# Patient Record
Sex: MALE | Race: BLACK OR AFRICAN AMERICAN
[De-identification: names, ages, dates, MRNs, and addresses within clinical notes are randomized per-mention and may not be internally consistent; named-entity substitution may affect disease eponyms.]

---

## 2018-12-27 ENCOUNTER — HOSPITAL ENCOUNTER (INPATIENT)
Dept: HOSPITAL 92 - SCSER | Age: 53
LOS: 3 days | Discharge: HOME | DRG: 357 | End: 2018-12-30
Attending: FAMILY MEDICINE | Admitting: FAMILY MEDICINE
Payer: COMMERCIAL

## 2018-12-27 DIAGNOSIS — K44.9: Primary | ICD-10-CM

## 2018-12-27 DIAGNOSIS — E66.01: ICD-10-CM

## 2018-12-27 DIAGNOSIS — Z79.4: ICD-10-CM

## 2018-12-27 DIAGNOSIS — I10: ICD-10-CM

## 2018-12-27 DIAGNOSIS — G47.33: ICD-10-CM

## 2018-12-27 DIAGNOSIS — K21.0: ICD-10-CM

## 2018-12-27 DIAGNOSIS — E11.9: ICD-10-CM

## 2018-12-27 DIAGNOSIS — K83.9: ICD-10-CM

## 2018-12-27 DIAGNOSIS — E78.5: ICD-10-CM

## 2018-12-27 LAB
ALBUMIN SERPL BCG-MCNC: 4.6 G/DL (ref 3.5–5)
ALP SERPL-CCNC: 111 U/L (ref 40–150)
ALT SERPL W P-5'-P-CCNC: 52 U/L (ref 8–55)
ANION GAP SERPL CALC-SCNC: 20 MMOL/L (ref 10–20)
AST SERPL-CCNC: 30 U/L (ref 5–34)
BILIRUB SERPL-MCNC: 0.7 MG/DL (ref 0.2–1.2)
BUN SERPL-MCNC: 16 MG/DL (ref 8.4–25.7)
CALCIUM SERPL-MCNC: 9.7 MG/DL (ref 7.8–10.44)
CHLORIDE SERPL-SCNC: 101 MMOL/L (ref 98–107)
CO2 SERPL-SCNC: 20 MMOL/L (ref 22–29)
CREAT CL PREDICTED SERPL C-G-VRATE: 0 ML/MIN (ref 70–130)
GLOBULIN SER CALC-MCNC: 3.7 G/DL (ref 2.4–3.5)
GLUCOSE SERPL-MCNC: 135 MG/DL (ref 70–105)
HGB BLD-MCNC: 15.4 G/DL (ref 14–18)
LIPASE SERPL-CCNC: 24 U/L (ref 8–78)
MCH RBC QN AUTO: 27.7 PG (ref 27–31)
MCV RBC AUTO: 91.1 FL (ref 78–98)
MDIFF COMPLETE?: YES
PLATELET # BLD AUTO: 367 THOU/UL (ref 130–400)
PLATELET BLD QL SMEAR: (no result)
POTASSIUM SERPL-SCNC: 3.6 MMOL/L (ref 3.5–5.1)
RBC # BLD AUTO: 5.55 MILL/UL (ref 4.7–6.1)
SODIUM SERPL-SCNC: 137 MMOL/L (ref 136–145)
WBC # BLD AUTO: 8.8 THOU/UL (ref 4.8–10.8)

## 2018-12-27 PROCEDURE — 88305 TISSUE EXAM BY PATHOLOGIST: CPT

## 2018-12-27 PROCEDURE — 36416 COLLJ CAPILLARY BLOOD SPEC: CPT

## 2018-12-27 PROCEDURE — 93306 TTE W/DOPPLER COMPLETE: CPT

## 2018-12-27 PROCEDURE — 96375 TX/PRO/DX INJ NEW DRUG ADDON: CPT

## 2018-12-27 PROCEDURE — 88313 SPECIAL STAINS GROUP 2: CPT

## 2018-12-27 PROCEDURE — 84484 ASSAY OF TROPONIN QUANT: CPT

## 2018-12-27 PROCEDURE — 80053 COMPREHEN METABOLIC PANEL: CPT

## 2018-12-27 PROCEDURE — 0FT44ZZ RESECTION OF GALLBLADDER, PERCUTANEOUS ENDOSCOPIC APPROACH: ICD-10-PCS | Performed by: SPECIALIST

## 2018-12-27 PROCEDURE — 93017 CV STRESS TEST TRACING ONLY: CPT

## 2018-12-27 PROCEDURE — S0028 INJECTION, FAMOTIDINE, 20 MG: HCPCS

## 2018-12-27 PROCEDURE — 0DB58ZX EXCISION OF ESOPHAGUS, VIA NATURAL OR ARTIFICIAL OPENING ENDOSCOPIC, DIAGNOSTIC: ICD-10-PCS | Performed by: INTERNAL MEDICINE

## 2018-12-27 PROCEDURE — 88312 SPECIAL STAINS GROUP 1: CPT

## 2018-12-27 PROCEDURE — 83036 HEMOGLOBIN GLYCOSYLATED A1C: CPT

## 2018-12-27 PROCEDURE — A9500 TC99M SESTAMIBI: HCPCS

## 2018-12-27 PROCEDURE — 84443 ASSAY THYROID STIM HORMONE: CPT

## 2018-12-27 PROCEDURE — 78452 HT MUSCLE IMAGE SPECT MULT: CPT

## 2018-12-27 PROCEDURE — 88304 TISSUE EXAM BY PATHOLOGIST: CPT

## 2018-12-27 PROCEDURE — 93005 ELECTROCARDIOGRAM TRACING: CPT

## 2018-12-27 PROCEDURE — 96374 THER/PROPH/DIAG INJ IV PUSH: CPT

## 2018-12-27 PROCEDURE — 83690 ASSAY OF LIPASE: CPT

## 2018-12-27 PROCEDURE — 80061 LIPID PANEL: CPT

## 2018-12-27 PROCEDURE — 85025 COMPLETE CBC W/AUTO DIFF WBC: CPT

## 2018-12-27 PROCEDURE — 71046 X-RAY EXAM CHEST 2 VIEWS: CPT

## 2018-12-27 PROCEDURE — 76700 US EXAM ABDOM COMPLETE: CPT

## 2018-12-27 PROCEDURE — 96361 HYDRATE IV INFUSION ADD-ON: CPT

## 2018-12-27 PROCEDURE — 36415 COLL VENOUS BLD VENIPUNCTURE: CPT

## 2018-12-27 NOTE — RAD
TWO VIEWS OF THE CHEST:

12/27/18

 

COMPARISON:  

7/18/11

 

HISTORY: 

Nausea, vomiting, and chest pain. 

 

FINDINGS:

Two views of the chest show normal sized cardiomediastinal silhouette. There is no evidence of consol
idation, mass, or pleural effusion. The bones are unremarkable.

 

IMPRESSION:

No evidence of acute cardiopulmonary disease.

 

POS: [facility]

## 2018-12-28 LAB
CHD RISK SERPL-RTO: 3.9 (ref ?–4.5)
CHOLEST SERPL-MCNC: 106 MG/DL
HDLC SERPL-MCNC: 27 MG/DL
LDLC SERPL CALC-MCNC: 61 MG/DL
TRIGL SERPL-MCNC: 92 MG/DL (ref ?–150)

## 2018-12-28 RX ADMIN — NITROGLYCERIN SCH: 20 OINTMENT TOPICAL at 11:02

## 2018-12-28 RX ADMIN — Medication SCH ML: at 20:48

## 2018-12-28 RX ADMIN — NITROGLYCERIN SCH: 20 OINTMENT TOPICAL at 06:47

## 2018-12-28 RX ADMIN — NITROGLYCERIN SCH: 20 OINTMENT TOPICAL at 03:43

## 2018-12-28 RX ADMIN — NITROGLYCERIN SCH: 20 OINTMENT TOPICAL at 18:29

## 2018-12-28 RX ADMIN — ASPIRIN SCH: 81 TABLET ORAL at 08:06

## 2018-12-28 RX ADMIN — ALOGLIPTIN SCH: 25 TABLET, FILM COATED ORAL at 09:13

## 2018-12-28 RX ADMIN — NITROGLYCERIN SCH: 20 OINTMENT TOPICAL at 23:36

## 2018-12-28 RX ADMIN — Medication SCH ML: at 08:07

## 2018-12-28 NOTE — CON
DATE OF CONSULTATION:  



REASON FOR CONSULTATION:  Abdominal discomfort.



HISTORY OF PRESENT ILLNESS:  Mr. Fuentes is a pleasant 53-year-old gentleman with

history of diabetes mellitus, hypertension, and hyperlipidemia, who recently

presented with abdominal discomfort.  The pain has waxed and waned.  It may be

associated with eating.  He states he has not had any chest discomfort.  This

refutes the report in the emergency room of the patient having pain.  He is adamant

that he has had no chest discomfort present.  He states the pain has been a crampy

like discomfort noted in the mid epigastric region.  He has had nausea with

retching.  No other __________ present.  His troponin is negative.  His EKG is not

felt to be specific.  His bedside echo also showed a normal LVEF with no wall motion

abnormalities present. 



PAST MEDICAL HISTORY:  As above including tonsillectomy.



ALLERGIES:  PENICILLIN.



HOME MEDICATIONS:  Include,

1. Atorvastatin.

2. Hydrochlorothiazide.

3. Amlodipine.

4. Ambien.

5. Aspirin.

6. Omeprazole.



REVIEW OF SYSTEMS:  A 10-point review of systems is reviewed and as above, otherwise

negative. 



PHYSICAL EXAMINATION:

GENERAL:  Patient is a pleasant 53-year-old male who is in no acute distress.  The

patient appears their stated age. 

VITAL SIGNS:  Blood pressure 131/76, pulse 87, temperature 97.7. 

NEUROLOGIC:  The patient is alert and oriented x3 with no focal neurologic deficits. 

HEENT:  Sclerae without icterus.  Mouth has moist mucous membranes with normal

pallor. 

NECK:  No JVD.  Carotid upstroke brisk.  No bruits bilaterally. 

LUNGS:  Clear to auscultation with unlabored respirations. 

BACK:  No scoliosis or kyphosis. 

CARDIAC:  Regular rate and rhythm with normal S1 and S2.  No S3 or S4 noted.  No

significant rubs, murmurs, thrills, or gallops noted throughout the precordium.  PMI

is not displaced.  There is no parasternal heave. 

ABDOMEN:  Mild discomfort noted in the mid epigastric region with palpation. 

EXTREMITIES:  2+ femoral and 2+ dorsalis pedis pulses.  No cyanosis, clubbing, or

edema. 

SKIN:  No gross abnormalities.



PERTINENT LABORATORY DATA:  As above.



IMPRESSION:  

1. Epigastric pain.

2. Diabetes mellitus.

3. Hypertension.



RECOMMENDATIONS:  __________ could proceed with coronary angiography, although given

his negative troponin, negative EKG, and negative echo, could proceed with a more

conservative approach.  A stress test has been ordered and certainly is appropriate.

 I would also recommend GI consultation to help assist with the current symptoms.

We will get the stress part today and proceed with the rest part tomorrow.  If the

stress part does show any abnormalities, may proceed with angio today.  I did

discuss with Mr. Fuentes. 







Job ID:  [id number]

## 2018-12-28 NOTE — NM
NUCLEAR MEDICINE CARDIAC PERFUSION EXAMINATION WITH EJECTION FRACTION

12/28/18

 

HISTORY: 

53-year-old male with chest pain, diabetes, and dyslipidemia. 

 

TECHNIQUE:  

A stress only nuclear medicine cardiac perfusion examination was performed using 30 millicuries techn
etium 99m Sestamibi and Adenosine.

 

FINDINGS:  

The nonattenuated corrected images show no perfusion defects with stress. 

 

Gated images show normal wall motion with ejection fraction of 61%. 

 

EDV is 100 mL. 

LHR is 0.2.

 

IMPRESSION:  

No perfusion defects seen with stress. 

 

POS: [facility]

## 2018-12-28 NOTE — HP
CHIEF COMPLAINT:  Atypical chest pain.



HISTORY OF PRESENT ILLNESS:  The patient is a 53-year-old male with onset

approximately 2 weeks ago of severe indigestion, worse days he has ever had in 
his

life; extremely diaphoretic, vomited several times, took hours to get relief, 
but he

was out of town in [place] at that time, eroded out and then this episode began

similarly with severe indigestion.  He vomited several times, very diaphoretic.

Denies shortness of breath, but has a lot of substernal discomfort.  He states 
that

it does not radiate, but he has multiple risk factors.  He is already on Pepcid 
on a

regular basis to treat GERD.  He has hypertension.  He has non-insulin-dependent

diabetes, stressful job as a long-distance , morbid obesity,

hyperlipidemia.  He does not smoke, but he is very noncompliant with his care 
and

this episode required some nitroglycerin to finally get relief in the emergency 
room

in [facility] in [place], at which time, Dr. Duncan was consulted and 
agreed

to admit him for further workup. 



PAST MEDICAL HISTORY:  As stated above.  He is noncompliant with his medical 
care.

He is a non-insulin-dependent diabetic.  He has dyslipidemia, morbid obesity, 
and

questionable history of obstructive sleep apnea.  He is not sure if he recalls a

sleep study in his past or not.  This has been recommended and he has not 
complied

to get this. GERD, HTN, shift work sleep disorder, obesity.



PAST SURGICAL HISTORY:  Tonsillectomy.



PSYCHIATRIC HISTORY:  There is no psychiatric history.



SOCIAL HISTORY:  As mentioned, he is a long-distance .  Denies 
alcohol

or tobacco use.  He is . 



ALLERGIES:  THE PATIENT IS ALLERGIC TO PENICILLIN.



CURRENT MEDICATIONS:  Include; 

1. Atorvastatin 40 mg at bedtime.

2. Hydrochlorothiazide 25 mg q.a.m.

3. Amlodipine 10 mg q.a.m.

4. Ambien 10 mg at bedtime as needed for irregular sleep phases being a

long-distance . 

5. Aspirin 81 mg daily.

6. Omeprazole 20 mg daily.

7. Glyxambi 25/5 one p.o. daily.



REVIEW OF SYSTEMS:  GENERAL:  At the time of admission, admits to being weak 
with

nausea and vomiting, but denies fever or chills. 

HEENT:  No sores in ear, nose, and throat.  No drainage. 

CHEST:  Denies shortness of breath or coughing. 

CARDIOVASCULAR:  Has substernal burning.  Has severe indigestion.  Denies

palpitations. 

GASTROINTESTINAL:  Reports both nausea and vomiting.  No diarrhea. 

GENITOURINARY:  No dysuria.  No blood in urine or stool. 

MUSCULOSKELETAL:  No complaints in any arms, legs, or major joints. 

SKIN:  No acute rashes or lesions. 

NEUROLOGIC:  Denies any headaches or focal weakness or paresthesia/anesthesia. 

HEMOLYMPHATIC:  No new areas of bruising, bleeding, or ecchymosis.



PHYSICAL EXAMINATION:

VITAL SIGNS:  At the time of admission, blood pressure 143/78, pulse 84,

respirations 16, temperature 97.9 with pain scale of 3/10, and O2 saturation 94
% on

room air. 

GENERAL:  This is an obese  male, alert, oriented, and 
cooperative. 

HEENT:  Normocephalic, atraumatic.  Pupils are equal, round, and reactive to 
light.

Extraocular muscles are intact.  TMs, nares, and pharynx are clear. 

NECK:  Supple.  Trachea midline.  No bruits bilaterally.  No mass. 

CHEST:  Good breath sounds bilaterally.  Nontender to palpation. 

HEART:  Regular rate and rhythm.  Distant heart sounds.  No audible murmurs. 

ABDOMEN:  Obese, nontender.  Unable to appreciate any organomegaly. 

GENITOURINARY:  Deferred. 

EXTREMITIES:  Without clubbing, cyanosis, or edema.  Normal range of motion 
present.

 Symmetric muscular tone development noted. 

SKIN:  No acute rashes or lesions.  He is mildly diaphoretic. 

NEUROLOGICAL:  Cranial nerves are intact.  Gait and cerebellar function are 
untested

at this time.  Sensory exam is generally intact.  Mental status shows nonfocal. 



DIAGNOSTIC DATA:  EKG showed an 81 beats per minute, incomplete right bundle-
branch

block with nonspecific ST abnormalities.  The chest x-ray showed no acute 
findings.

Cardiac enzymes are negative thus far.  Sodium 137, potassium 3.6, chloride 101
, CO2

of 20, BUN 16, creatinine 0.93 with a GFR of greater than 90, and glucose 135.

Liver functions are normal.  Lipase 24.  WBCs 8.8, hemoglobin 15.4, hematocrit 
50.5,

and platelet count 367. 



ASSESSMENT:  

1. Probable unstable angina in high-risk individual, who has been noncompliant 
with

his medical care vs severe GERD.

2. Hypertension.

3. Non-insulin-dependent diabetic.

4. Dyslipidemia.

5. Probable obstructive sleep apnea, noncompliant with testing.



PLAN:  We will get a Cardiolite stress testing.  Cardiology consultation.

Echocardiogram.  Maintain him on his topical nitro.  Continue daily aspirin, 
and we

will put on an O2 monitor to check for desats.  He will be serially re-
evaluated. 







Job ID:  [id number]



MTDD

## 2018-12-29 RX ADMIN — NITROGLYCERIN SCH: 20 OINTMENT TOPICAL at 20:00

## 2018-12-29 RX ADMIN — NITROGLYCERIN SCH: 20 OINTMENT TOPICAL at 15:48

## 2018-12-29 RX ADMIN — Medication SCH ML: at 10:41

## 2018-12-29 RX ADMIN — NITROGLYCERIN SCH: 20 OINTMENT TOPICAL at 15:47

## 2018-12-29 RX ADMIN — ALOGLIPTIN SCH MG: 25 TABLET, FILM COATED ORAL at 10:41

## 2018-12-29 RX ADMIN — Medication SCH: at 22:25

## 2018-12-29 RX ADMIN — ASPIRIN SCH MG: 81 TABLET ORAL at 10:40

## 2018-12-29 RX ADMIN — NITROGLYCERIN SCH: 20 OINTMENT TOPICAL at 19:59

## 2018-12-29 RX ADMIN — NITROGLYCERIN SCH: 20 OINTMENT TOPICAL at 06:15

## 2018-12-29 NOTE — ULT
ABDOMINAL ULTRASOUND:

 

DATE: 12/29/2018.

 

HISTORY: 

Abdominal pain.

 

FINDINGS: 

The liver demonstrates increased echogenicity and mildly coarsened echotexture likely attributable to
 diffuse fatty infiltration. 

 

There is a small amount of echogenic material seen dependent within the gallbladder lumen likely rela
nael to a tiny amount of sludge.  No gallbladder calculus is seen. There is no gallbladder wall thicke
gino or pericholecystic fluid seen.  The common duct measures 0.5 cm in diameter.

 

The limited visualized portions of the pancreas, visualized portions of the IVC, abdominal aorta, spl
een, and bilateral kidneys demonstrate a normal sonographic appearance.  The right kidney measures 11
.9 cm in length and the left kidney measures 11.5 cm in length.

 

IMPRESSION: 

1.  Small amount of gallbladder sludge.  No gallbladder calculi are seen, and the common duct is norm
al in caliber.

 

2.  Mild fatty infiltration of the liver.

 

POS: [facility]

## 2018-12-29 NOTE — CON
DATE OF CONSULTATION:  2018



REFERRING PHYSICIANS:  

1. Mg Francois MD.

2. Usman Duncan MD.



REASON FOR CONSULTATION:  Severe indigestion, abdominal pain, nausea, and 
vomiting.



HISTORY OF PRESENT ILLNESS:  Brenden Fuentes is a very pleasant 53-year-old male 
with

abdominal pain, nausea, vomiting, and severe indigestion, although the admitting

history and physical says he has chest pain, but the patient denies ever having 
any

chest pain.  He was also seen by Dr. Francois and again he told Dr. Francois

that he has more of_ indigestion and abdominal pain.  The patient is known to 
have acid reflux

over the years.  He takes omeprazole off and on.  The patient is a long-distance

.  He was in [place], [place] over couple of weeks ago and had severe

indigestion, and he took omeprazole, which did not help.  He also has some

epigastric discomfort and abdominal pain.The pain was cramping in nature with 
nausea and vomiting.He had recurrence of symptoms two

days ago.  These symptoms were exactly same thing that what he had 2 weeks ago.
  He

had severe indigestion and abdominal cramping pain over the upper abdomen.  He 
has

some nausea and vomiting also.  He had no fever or chills.  The patient tried 
taking

omeprazole, which did not help him.  Since admission, his symptoms had

resolved.  He has no abdominal pain. .  The patient tells me that he had

seen me more than 10 years ago and had a colonoscopy and EGD.  He was told he 
had

acid reflux and apparently he has been placed on omeprazole by me, it is more 
than

10 years ago.  The patient had not seen me in about 10 years.  Bowel movements 
are

regular.  There is no history of hematochezia or any melena.  The patient does 
have

a family history of gallstones.  Apparently, his mother had gallstones and also

sister had gallstones.  No relevant history. 



ALLERGIES:  PENICILLIN.



SOCIAL HISTORY:  The patient does not smoke or drink alcohol.



MEDICAL ILLNESSES:  

1. Hypertension.

2. Diabetes mellitus. 

3. Hyperlipidemia.

4. Obesity.

5. Chronic acid reflux.



SURGERIES:  

1. Tonsillectomy.  

2. 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



PAST PSYCHIATRIC HISTORY:  No evidence of depression or anxiety.



FAMILY HISTORY:  Father and mother both had diabetes and father  of heart

attack, sister also had diabetes. 



MEDICATIONS:  List reviewed.



REVIEW OF SYSTEMS:  A 10-point system review. 

CONSTITUTIONAL:  No history of weight loss, has good energy level.  Good 
exercise

tolerance. 

HEAD:  No chronic headache.  No syncope. No TIA. 

EYES:  No double vision.  No diplopia.   

EARS:  No hearing loss.  No ear pain.  No discharge.   

NOSE:  No nosebleed. 

THROAT:  No sore throat or any painful swallowing.   

LUNGS:  No chronic coughing.  No hemoptysis.  No dyspnea.  CARDIOVASCULAR:  No 
chest

pain.  No palpitation.  No dyspnea, orthopnea, or PND. 

GI:  As in history of present illness. 

:  No dysuria, hematuria, or frequency in urination.   

MUSCULOSKELETAL:  Unknown. 

ENDOCRINE:  Unknown.



PHYSICAL EXAMINATION:

GENERAL:  The patient is obese, appears to be comfortable, in no acute 
distress.  At

the present time, he has abdominal pain. 

VITAL SIGNS:  Stable.  Afebrile.  Temperature 97.8 degrees Fahrenheit, pulse is 
82,

blood pressure is 150/90. 

HEENT:  Conjunctivae clear.   

NECK:  Supple.  No adenitis or thyromegaly noted.   

CARDIOVASCULAR SYSTEM:  First and second heart sounds heard.  LUNGS:  Clear to

auscultate. 

ABDOMEN:  Very benign.  Abdomen is nondistended.  Abdomen is nontender.  There 
were

no operative masses. 

EXTREMITIES:  No edema.



LABORATORY DATA:  CBC; WBC 8800, hemoglobin 15.4, hematocrit of 50.5, MCV 91.1,

platelet count is 367,000, polymorphs 57, bands 3, lymphocytes 17.  Chemistry 
panel

shows triglycerides 92, cholesterol is 106, LDL is 61.  TSH is 0.95.  
Hemoglobin A1c

is 5.8.  His EKG and cardiac enzymes are negative. 



CLINICAL IMPRESSION:  A 53-year-old male with severe indigestion, nausea, 
vomiting,

and abdominal pain.  He denies having chest pain.  The patient most likely has 
acid

reflux and possibly  gallstone.  Although, he has had severe indigestion,

he did not have any response to PPI.. 



RECOMMENDATION:  Abdominal sonogram tomorrow followed by the EGD tomorrow.  I 
will

make further recommendations after the EGD and abdominal sonogram. 







Job ID:  [id number]



[facility]
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
## 2018-12-29 NOTE — STRESS
Acquisition Time: 2018-12-28  10:31:59

Total Exercise Time: 00:03:02

Test Indications: CHEST PAIN

Medications: 

 

 

 

 

 

 

 

 

 

Protocol: ADENOSINE

Max HR: 102 BPM  61% of  Pred: 165 BPM

Max BP: 138/080 mmHG

Max Work Load: 1.0 METS

 

RESTING ECG: NORMAL SINUS RHYTHM AT 87 BPM 

SYMPTOMS: NONE 

NORMAL BP RESPONSE 

ECTOPY: NONE 

ECG STRESS: NO SIGNIFUCANT CHANGES 

INTERPRETATION: NEGATIVE EKG/AWAIT NUCLEAR IMAGES FOR DEFINITIVE DIAGNOSIS 

COMMENTS: PATIENT WAS GIVEN LEXISCAN 

 

Confirmed by BENJAMIN MOHAN ELLEN ([id number]) on 12/29/2018 2:57:26 PM

 

Referred By: MD PATIENCE PINZON           Confirmed By:PARUL MOHAN PA-C

## 2018-12-30 VITALS — SYSTOLIC BLOOD PRESSURE: 119 MMHG | DIASTOLIC BLOOD PRESSURE: 70 MMHG

## 2018-12-30 VITALS — TEMPERATURE: 97.5 F

## 2018-12-30 LAB
ALBUMIN SERPL BCG-MCNC: 3.6 G/DL (ref 3.5–5)
ALP SERPL-CCNC: 92 U/L (ref 40–150)
ALT SERPL W P-5'-P-CCNC: 28 U/L (ref 8–55)
ANION GAP SERPL CALC-SCNC: 12 MMOL/L (ref 10–20)
AST SERPL-CCNC: 21 U/L (ref 5–34)
BASOPHILS # BLD AUTO: 0.1 THOU/UL (ref 0–0.2)
BASOPHILS NFR BLD AUTO: 0.9 % (ref 0–1)
BILIRUB SERPL-MCNC: 0.7 MG/DL (ref 0.2–1.2)
BUN SERPL-MCNC: 13 MG/DL (ref 8.4–25.7)
CALCIUM SERPL-MCNC: 9 MG/DL (ref 7.8–10.44)
CHLORIDE SERPL-SCNC: 100 MMOL/L (ref 98–107)
CO2 SERPL-SCNC: 26 MMOL/L (ref 22–29)
CREAT CL PREDICTED SERPL C-G-VRATE: 181 ML/MIN (ref 70–130)
EOSINOPHIL # BLD AUTO: 0.1 THOU/UL (ref 0–0.7)
EOSINOPHIL NFR BLD AUTO: 1.1 % (ref 0–10)
GLOBULIN SER CALC-MCNC: 2.9 G/DL (ref 2.4–3.5)
GLUCOSE SERPL-MCNC: 106 MG/DL (ref 70–105)
HGB BLD-MCNC: 13 G/DL (ref 14–18)
LYMPHOCYTES # BLD: 2.9 THOU/UL (ref 1.2–3.4)
LYMPHOCYTES NFR BLD AUTO: 45.6 % (ref 21–51)
MCH RBC QN AUTO: 29.6 PG (ref 27–31)
MCV RBC AUTO: 90.9 FL (ref 78–98)
MONOCYTES # BLD AUTO: 0.9 THOU/UL (ref 0.11–0.59)
MONOCYTES NFR BLD AUTO: 14.6 % (ref 0–10)
NEUTROPHILS # BLD AUTO: 2.4 THOU/UL (ref 1.4–6.5)
NEUTROPHILS NFR BLD AUTO: 37.8 % (ref 42–75)
PLATELET # BLD AUTO: 364 THOU/UL (ref 130–400)
POTASSIUM SERPL-SCNC: 3.4 MMOL/L (ref 3.5–5.1)
RBC # BLD AUTO: 4.39 MILL/UL (ref 4.7–6.1)
SODIUM SERPL-SCNC: 135 MMOL/L (ref 136–145)
WBC # BLD AUTO: 6.2 THOU/UL (ref 4.8–10.8)

## 2018-12-30 RX ADMIN — ALOGLIPTIN SCH: 25 TABLET, FILM COATED ORAL at 09:00

## 2018-12-30 RX ADMIN — ASPIRIN SCH: 81 TABLET ORAL at 09:00

## 2018-12-30 NOTE — OP
DATE OF PROCEDURE:  12/30/2018



PREOPERATIVE DIAGNOSIS:  Symptomatic gallbladder sludge.



POSTOPERATIVE DIAGNOSIS:  Symptomatic gallbladder sludge.



PROCEDURE PERFORMED:  Laparoscopic cholecystectomy.



ANESTHESIA:  General endotracheal.



INDICATIONS:  The patient is an obese 53-year-old black male.  He presents with

recurrent episodes of epigastric abdominal pain and evidence of gallbladder sludge

on ultrasound.  After discussing options, he has elected to proceed with

laparoscopic cholecystectomy. 



PROCEDURE IN DETAIL:  Informed consent was obtained.  The patient was taken to the

operating room where general endotracheal anesthesia was obtained with the patient

in the supine position.  The abdomen was prepped with Betadine and draped in the

usual sterile fashion.  0.25% Marcaine with epinephrine was infiltrated below the

umbilicus and a 10 mm infraumbilical incision was created.  A Veress needle was

passed through this incision into the peritoneal cavity.  A pneumoperitoneum was

established using carbon dioxide up to a pressure of 15 mmHg.  Local anesthetic was

infiltrated and 3 additional 5 mm right upper quadrant incisions were created.

Through the mid incision, a 5 mm port was passed into the peritoneal cavity.  The

camera was passed through this port and under direct vision, an 11 port was passed

through the infraumbilical incision.  The camera was replaced through this port, and

under direct vision, 2 additional 5 mm ports were passed through the incisions

already created. 



The gallbladder was grasped and retracted in a cephalad direction.  Minimal

adhesions were bluntly stripped away from the apex of the gallbladder, and the apex

was retracted laterally and inferiorly.  Careful dissection was carried out to the

apex of the gallbladder to identify the cystic duct and cystic artery.  These were

each carefully dissected circumferentially.  The duct was of normal caliber.  Both

the duct and the artery were divided between clips, leaving 2 on the side to remain

within the abdomen.  The gallbladder was then dissected out of the gallbladder fossa

using electrocautery and removed through the infraumbilical port site.  The fascia

was closed with 0 Vicryl suture and a GraNee needle.  The right upper quadrant was

inspected and irrigated.  All irrigant was aspirated.  All ports and instruments

were removed under direct vision.  Pneumoperitoneum was carefully evacuated.

Additional local anesthetic was infiltrated into each port site.  The skin edges

were approximated with 4-0 Monocryl subcuticular sutures, and Dermabond was placed

externally.  There were no complications.  The patient tolerated the procedure well

and was taken to the recovery room in stable condition. 



FINDINGS:  The patient had no acute inflammation associated with the gallbladder.

There were some adhesions to it that was easily taken down.  The duct was generous

in size, but cholangiogram was not obtained as there was no evidence of

cholelithiasis nor any liver function test elevation.  Blood loss was negligible. 



There was some bleeding through one of the port sites in the right paramedian

location.  This was presumed to be an injury to the epigastric vessels.  This

bleeding was ligated with an 0 Vicryl suture using a GraNee needle with cessation of

high-flow bleeding. 



The patient tolerated the procedure well and was taken to recovery room in stable

condition. 







Job ID:  [id number]

## 2018-12-30 NOTE — OP
DATE OF PROCEDURE:  12/29/2018



OPERATIVE PROCEDURE:  Esophagogastroduodenoscopy with biopsy.



PREOPERATIVE DIAGNOSES:  Severe indigestion, abdominal pain, nausea, and 
vomiting.



POSTOPERATIVE DIAGNOSES:  

1. Small hiatal hernia.

2. Irregular Z-line with mild esophagitis  distal esophagus.

3. Normal stomach and duodenum.



DESCRIPTION OF PROCEDURE:  The patient was placed on his left lateral position 
and

was given sedation by Anesthesia Department.  A Pentax video gastroscope under

direct vision passed down the oropharynx to the GE junction into the stomach and

subsequently into the descending duodenum.  Although, the patient gave history 
of

severe indigestion, on endoscopy, the mucosa actually appears normal.  The  GE

junction, the mucosa irregular.  There is a small erosion and mild esophagitis 
seen.

 A small hiatal hernia.  Retroflexion failed to show any pathology in the 
fundus or

cardia.  In the gastric body and gastric antrum, no pathology seen.  In the 
pyloric

channel, no pathology seen.  In the duodenal bulb, descending duodenum, no 
pathology

seen.  The scope was withdrawn back to the esophagus and biopsies obtained in 
the GE

junction.  The stomach was decompressed and scope removed. 



RECOMMENDATIONS:  

1. Continue pantoprazole 40 mg once a day.

2. If his symptoms persist , will consider HIDA scan, as the abdominal sonogram 
showed biliary  sludge, no stone

seen. 







Job ID:  [id number]



[facility]